# Patient Record
Sex: FEMALE | Race: WHITE | NOT HISPANIC OR LATINO | Employment: OTHER | URBAN - METROPOLITAN AREA
[De-identification: names, ages, dates, MRNs, and addresses within clinical notes are randomized per-mention and may not be internally consistent; named-entity substitution may affect disease eponyms.]

---

## 2021-08-11 ENCOUNTER — TELEPHONE (OUTPATIENT)
Dept: GASTROENTEROLOGY | Facility: CLINIC | Age: 76
End: 2021-08-11

## 2021-08-11 NOTE — TELEPHONE ENCOUNTER
Patient's daughter, Corrie called  Not sure when they will be able to schedule, if they even do   Will call us when and if ready to schedule

## 2021-08-11 NOTE — TELEPHONE ENCOUNTER
LMOM for patient or family member to phone back an schedule an appt with Dr Pedro Castaneda for a colonoscopy consultation